# Patient Record
Sex: FEMALE | Race: WHITE | NOT HISPANIC OR LATINO
[De-identification: names, ages, dates, MRNs, and addresses within clinical notes are randomized per-mention and may not be internally consistent; named-entity substitution may affect disease eponyms.]

---

## 2023-03-25 ENCOUNTER — NON-APPOINTMENT (OUTPATIENT)
Age: 33
End: 2023-03-25

## 2023-03-27 ENCOUNTER — NON-APPOINTMENT (OUTPATIENT)
Age: 33
End: 2023-03-27

## 2024-01-12 ENCOUNTER — APPOINTMENT (OUTPATIENT)
Dept: ORTHOPEDIC SURGERY | Facility: CLINIC | Age: 34
End: 2024-01-12
Payer: COMMERCIAL

## 2024-01-12 ENCOUNTER — TRANSCRIPTION ENCOUNTER (OUTPATIENT)
Age: 34
End: 2024-01-12

## 2024-01-12 VITALS
WEIGHT: 253 LBS | SYSTOLIC BLOOD PRESSURE: 132 MMHG | BODY MASS INDEX: 34.27 KG/M2 | DIASTOLIC BLOOD PRESSURE: 83 MMHG | HEIGHT: 72 IN

## 2024-01-12 DIAGNOSIS — Z87.09 PERSONAL HISTORY OF OTHER DISEASES OF THE RESPIRATORY SYSTEM: ICD-10-CM

## 2024-01-12 DIAGNOSIS — M22.41 CHONDROMALACIA PATELLAE, RIGHT KNEE: ICD-10-CM

## 2024-01-12 DIAGNOSIS — M22.8X1 OTHER DISORDERS OF PATELLA, RIGHT KNEE: ICD-10-CM

## 2024-01-12 DIAGNOSIS — Z78.9 OTHER SPECIFIED HEALTH STATUS: ICD-10-CM

## 2024-01-12 DIAGNOSIS — Z80.8 FAMILY HISTORY OF MALIGNANT NEOPLASM OF OTHER ORGANS OR SYSTEMS: ICD-10-CM

## 2024-01-12 DIAGNOSIS — S83.241A OTHER TEAR OF MEDIAL MENISCUS, CURRENT INJURY, RIGHT KNEE, INITIAL ENCOUNTER: ICD-10-CM

## 2024-01-12 DIAGNOSIS — M51.26 OTHER INTERVERTEBRAL DISC DISPLACEMENT, LUMBAR REGION: ICD-10-CM

## 2024-01-12 PROCEDURE — 73564 X-RAY EXAM KNEE 4 OR MORE: CPT | Mod: RT

## 2024-01-12 PROCEDURE — 99204 OFFICE O/P NEW MOD 45 MIN: CPT

## 2024-01-12 RX ORDER — MELOXICAM 15 MG/1
15 TABLET ORAL
Refills: 0 | Status: ACTIVE | COMMUNITY

## 2024-01-12 NOTE — HISTORY OF PRESENT ILLNESS
[de-identified] : Ms. FATIMAH OLIVARES is a 33 year old female presenting for evaluation of right knee pain for the past three months now worsening. Patient localizes the pain medially and anteriorly. Pain is worse with stairs and deep flexion. Patient went for an MRI which revealed medial meniscus tear and chondromalacia patella. Patient had a steroid injection two months ago with only mild improvement. Patient has tried PT and NSAIDs without improvement.

## 2024-01-12 NOTE — DISCUSSION/SUMMARY
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 A prescription for physical therapy was provided. The patient was provided with a patella stabilization brace to be worn during activity to prevent patellar dislocation/subluxation. If her pain does not improve, the patient will follow up with an orthopedic sports surgeon to discuss possible patella realignment surgery. An appropriate referral was provided.

## 2024-01-12 NOTE — PHYSICAL EXAM
[de-identified] : The patient appears well nourished and in no apparent distress.  The patient is alert and oriented to person, place, and time.   Affect and mood appear normal. The head is normocephalic and atraumatic.  The eyes reveal normal sclera and extra ocular muscles are intact. The tongue is midline with no apparent lesions.  Skin shows normal turgor with no evidence of eczema or psoriasis.  No respiratory distress noted.  Sensation grossly intact.		  [de-identified] : Exam of the right knee shows full extension. There is minimal effusion. There is no instability. There is lateral patella tracking. No j sign. Negative anterior drawer. She is tender inferior pole patella.  5/5 motor strength bilaterally distally. Sensation intact distally.		  [de-identified] : X-ray: 4 views of the right knee demonstrate mild patellofemoral compartment joint space narrowing. 	  Right Knee MRI Done By Faiza Betts On 11/15/2023 Impression and results from my independent review in office today and radiology report:  Small pallavi-PCL ganglion cyst. The PCL is intrinsically intact. Mild degeneration of the medial meniscus with a peripheral inferior surface tear of the body segment. This is favored to be chronic. Mild chondromalacia over the medial femoral condyle, a focal full-thickness defect accompanied by subchondral marrow edema.  Mild medial patellar chondromalacia.

## 2024-01-12 NOTE — ADDENDUM
[FreeTextEntry1] : This note was authored by Dioni Orta working as a medical scribe for Dr. Steve Huizar. The note was reviewed, edited, and revised by Dr. Steve Huizar whom is in agreement with the exam findings, imaging findings, and treatment plan. 01/12/2024

## 2024-01-19 ENCOUNTER — APPOINTMENT (OUTPATIENT)
Dept: ORTHOPEDIC SURGERY | Facility: CLINIC | Age: 34
End: 2024-01-19